# Patient Record
Sex: MALE | Race: WHITE
[De-identification: names, ages, dates, MRNs, and addresses within clinical notes are randomized per-mention and may not be internally consistent; named-entity substitution may affect disease eponyms.]

---

## 2018-08-21 ENCOUNTER — HOSPITAL ENCOUNTER (EMERGENCY)
Dept: HOSPITAL 97 - ER | Age: 33
Discharge: HOME | End: 2018-08-21
Payer: COMMERCIAL

## 2018-08-21 DIAGNOSIS — I10: Primary | ICD-10-CM

## 2018-08-21 DIAGNOSIS — F90.9: ICD-10-CM

## 2018-08-21 LAB
ALBUMIN SERPL BCP-MCNC: 3.7 G/DL (ref 3.4–5)
ALP SERPL-CCNC: 49 U/L (ref 45–117)
ALT SERPL W P-5'-P-CCNC: 24 U/L (ref 12–78)
AST SERPL W P-5'-P-CCNC: 17 U/L (ref 15–37)
BUN BLD-MCNC: 19 MG/DL (ref 7–18)
GLUCOSE SERPLBLD-MCNC: 91 MG/DL (ref 74–106)
HCT VFR BLD CALC: 46.6 % (ref 39.6–49)
INR BLD: 1.09
LYMPHOCYTES # SPEC AUTO: 1.6 K/UL (ref 0.7–4.9)
MAGNESIUM SERPL-MCNC: 2 MG/DL (ref 1.8–2.4)
MCH RBC QN AUTO: 28.4 PG (ref 27–35)
MCV RBC: 84.1 FL (ref 80–100)
PMV BLD: 9.9 FL (ref 7.6–11.3)
POTASSIUM SERPL-SCNC: 3.7 MMOL/L (ref 3.5–5.1)
RBC # BLD: 5.53 M/UL (ref 4.33–5.43)

## 2018-08-21 PROCEDURE — 85025 COMPLETE CBC W/AUTO DIFF WBC: CPT

## 2018-08-21 PROCEDURE — 84484 ASSAY OF TROPONIN QUANT: CPT

## 2018-08-21 PROCEDURE — 82550 ASSAY OF CK (CPK): CPT

## 2018-08-21 PROCEDURE — 85610 PROTHROMBIN TIME: CPT

## 2018-08-21 PROCEDURE — 93005 ELECTROCARDIOGRAM TRACING: CPT

## 2018-08-21 PROCEDURE — 83735 ASSAY OF MAGNESIUM: CPT

## 2018-08-21 PROCEDURE — 36415 COLL VENOUS BLD VENIPUNCTURE: CPT

## 2018-08-21 PROCEDURE — 71045 X-RAY EXAM CHEST 1 VIEW: CPT

## 2018-08-21 PROCEDURE — 80048 BASIC METABOLIC PNL TOTAL CA: CPT

## 2018-08-21 PROCEDURE — 85730 THROMBOPLASTIN TIME PARTIAL: CPT

## 2018-08-21 PROCEDURE — 99284 EMERGENCY DEPT VISIT MOD MDM: CPT

## 2018-08-21 PROCEDURE — 80076 HEPATIC FUNCTION PANEL: CPT

## 2018-08-21 NOTE — ER
Nurse's Notes                                                                                     

 [facility]                                                                

Name: Kira Muhamamd III                                                                           

Age: 33 yrs                                                                                       

Sex: Male                                                                                         

: 1985                                                                                   

MRN: [id number]                                                                                   

Arrival Date: 2018                                                                          

Time: 17:28                                                                                       

Account#: [id number]                                                                            

Bed 16                                                                                            

Private MD:                                                                                       

Diagnosis: Essential (primary) hypertension                                                       

                                                                                                  

Presentation:                                                                                     

                                                                                             

17:25 Presenting complaint: EMS states: non radiating central chest pain and dizziness since  cc3 

      a couple of days, currently patient has no chest pain. Transition of care: patient was      

      not received from another setting of care. Onset of symptoms is unknown. Risk               

      Assessment: Do you want to hurt yourself or someone else? Patient reports no desire to      

      harm self or others. Initial Sepsis Screen: Does the patient meet any 2 criteria? No.       

      Patient's initial sepsis screen is negative. Does the patient have a suspected source       

      of infection? No. Patient's initial sepsis screen is negative. Care prior to arrival:       

      EMS started an IV fluid of NS at [facility].                                                       

17:25 Method Of Arrival: EMS: [facility]                                                cc3 

17:25 Acuity: SOFIE 3                                                                           cc3 

                                                                                                  

Triage Assessment:                                                                                

17:25 General: Appears in no apparent distress. comfortable, Behavior is calm, cooperative,   cc3 

      appropriate for age. Pain: Denies pain. EENT: No signs and/or symptoms were reported        

      regarding the EENT system. Neuro: Level of Consciousness is awake, alert, obeys             

      commands, Oriented to person, place, time, situation, Appropriate for age.                  

      Cardiovascular: Denies chest pain, patient had chest pain and dizziness since a couple      

      of days before but currently denies chest pain. Respiratory: Airway is patent               

      Respiratory effort is even, unlabored, Respiratory pattern is regular, symmetrical. GI:     

      No signs and/or symptoms were reported involving the gastrointestinal system. : No        

      signs and/or symptoms were reported regarding the genitourinary system. Derm: No signs      

      and/or symptoms reported regarding the dermatologic system. Musculoskeletal: No signs       

      and/or symptoms reported regarding the musculoskeletal system.                              

                                                                                                  

Historical:                                                                                       

- Allergies:                                                                                      

17:25 No Known Allergies;                                                                     cc3 

- Home Meds:                                                                                      

17:25 Focalin oral 40 mg tab oral 1 tab once a day [Active];                                  cc3 

- PMHx:                                                                                           

17:25 ADD/ADHD;                                                                               cc3 

- PSHx:                                                                                           

17:25 gastric sleeve surgery in ;                                                         cc3 

                                                                                                  

- Immunization history:: Adult Immunizations up to date.                                          

- Social history:: Smoking status: Patient/guardian denies using tobacco, never smoked.           

- Ebola Screening: : Patient denies travel to an Ebola-affected area in the 21 days               

  before illness onset No symptoms or risks identified at this time.                              

                                                                                                  

                                                                                                  

Screenin:25 Abuse screen: Denies threats or abuse. Denies injuries from another. Nutritional        hj  

      screening: No deficits noted. Tuberculosis screening: No symptoms or risk factors           

      identified. Fall Risk None identified.                                                      

17:25 Abuse screen: Denies threats or abuse. Denies injuries from another. Nutritional        cc3 

      screening: No deficits noted. Tuberculosis screening: No symptoms or risk factors           

      identified. Fall Risk IV access (20 points).                                                

                                                                                                  

Assessment:                                                                                       

17:25 General: see triage assessment.                                                         cc3 

18:32 Reassessment: Patient and/or family updated on plan of care and expected duration. Pain cc3 

      level reassessed. Patient is alert, oriented x 3, equal unlabored respirations, skin        

      warm/dry/pink. Patient states feeling better. Patient states symptoms have improved.        

19:56 Reassessment: Patient appears in no apparent distress at this time. Patient and/or      bs1 

      family updated on plan of care and expected duration. Pain level reassessed. Patient is     

      alert, oriented x 3, equal unlabored respirations, skin warm/dry/pink. Patient states       

      understanding of discharge instructions/POC. Patient denies pain at this time. Patient      

      states feeling better.                                                                      

                                                                                                  

Vital Signs:                                                                                      

17:25  / 94; Pulse 77; Resp 19; Pulse Ox 100% on R/A; Pain 0/10;                        cc3 

18:32  / 96; Pulse 65; Resp 18; Pulse Ox 100% on R/A;                                   cc3 

18:45  / 81 Supine; Pulse 68;                                                           cc3 

18:47  / 94 Sitting; Pulse 67;                                                          cc3 

18:49  / 96 Standing; Pulse 67;                                                         cc3 

19:30  / 88; Pulse 57; Resp 17; Temp 98(O); Pulse Ox 99% on R/A; Pain 0/10;             bs1 

                                                                                                  

[place] Coma Score:                                                                               

17:25 Eye Response: spontaneous(4). Verbal Response: oriented(5). Motor Response: obeys       cc3 

      commands(6). Total: 15.                                                                     

                                                                                                  

ED Course:                                                                                        

17:25 Maintain EMS IV. Dressing intact. Good blood return noted. Site clean \T\ dry. Gauge \T\    cc
3

      site: 18 right ACV.                                                                         

17:25 Arm band placed on right wrist.                                                         cc3 

17:25 Patient has correct armband on for positive identification. Placed in gown. Bed in low  hj  

      position. Call light in reach. Side rails up X 1. Adult w/ patient.                         

17:28 Patient arrived in ED.                                                                  hj  

17:28 Gilberto Marinelli NP is PHCP.                                                           pm1 

17:28 Cristobal Carlton MD is Attending Physician.                                                pm1 

17:43 EKG done, by EKG tech. reviewed by Gilberto Marinelli NP.                                  sm3 

17:45 Jose A Dominguez, RN is Primary Nurse.                                                    hj  

18:04 Triage completed.                                                                       cc3 

18:17 X-ray completed. Portable x-ray completed in exam room. Patient tolerated procedure     mh1 

      well.                                                                                       

18:18 XRAY Chest (1 view) In Process Unspecified.                                             EDMS

19:00 Report given to night shift AXEL Salcedo for continuity of care.                             cc3 

19:55 No provider procedures requiring assistance completed. IV discontinued, bleeding        bs1 

      controlled, No redness/swelling at site. Pressure dressing applied.                         

                                                                                                  

Administered Medications:                                                                         

No medications were administered                                                                  

                                                                                                  

                                                                                                  

Outcome:                                                                                          

19:25 Discharge ordered by MD.                                                                pm1 

19:56 Discharged to home ambulatory, with significant other.                                  bs1 

19:56 Condition: stable                                                                           

19:56 Discharge instructions given to patient, Instructed on discharge instructions, follow       

      up and referral plans. Demonstrated understanding of instructions, follow-up care.          

19:56 Patient left the ED.                                                                    bs1 

                                                                                                  

Signatures:                                                                                       

Dispatcher [employer]                           EDMS                                                 

Keyla Trujillo                               [facility]                                                  

Jose A Dominguez RN RN                                                      

Gilberto Marinelli NP                    NP   pm1                                                  

Joselyn Fink RN                   RN   bs1                                                  

Lissette Reddy                              sm3                                                  

Nelia Kim                             cc3                                                  

                                                                                                  

Corrections: (The following items were deleted from the chart)                                    

19:21 18:45  / 81 Supine; cc3                                                           cc3 

19:21 18:47  / 94 Sitting; cc3                                                          cc3 

19:21 18:49  / 96 Standing; cc3                                                         cc3 

                                                                                                  

**************************************************************************************************

## 2018-08-21 NOTE — RAD REPORT
EXAM DESCRIPTION:  RAD - Chest Single View - 8/21/2018 6:18 pm

 

CLINICAL HISTORY:  Chest pain

 

COMPARISON:  None.

 

TECHNIQUE:  AP portable chest image was obtained 1814 hours .

 

FINDINGS:  Lungs are clear. Heart and vasculature are normal. No measurable pleural effusion and no p
neumothorax. No gross bony abnormality seen. No acute aortic findings suspected.

 

IMPRESSION:  No acute cardiopulmonary process.

## 2018-08-21 NOTE — EDPHYS
Physician Documentation                                                                           

 [facility]                                                                

Name: Kira Muhammad III                                                                           

Age: 33 yrs                                                                                       

Sex: Male                                                                                         

: 1985                                                                                   

MRN: [id number]                                                                                   

Arrival Date: 2018                                                                          

Time: 17:28                                                                                       

Account#: [id number]                                                                            

Bed 16                                                                                            

Private MD:                                                                                       

ED Physician Cristobal Carlton                                                                         

HPI:                                                                                              

                                                                                             

19:00 This 33 yrs old  Male presents to ER via EMS with complaints of High Blood     pm1 

      Pressure.                                                                                   

19:00 The patient has elevated blood pressure and discovered this at home. Onset: The         pm1 

      symptoms/episode began/occurred yesterday. Associated signs and symptoms: Pertinent         

      positives: Chest pain and palpitations yesterday that resolved. Severity of symptoms:       

      in the emergency department the blood pressure is. The patient has not recently seen a      

      physician. Patient with chest pain and sensation of palpitations yesterday that             

      resolved. Patient with elevated blood pressure today and went to the fire station for       

      further evaluation. He got an EKG and was told that it might be abnormal and to get it      

      further evaluated. Patient without any complaints today other than elevated blood           

      pressure. Patient without any chest pain or shortness of breath that resolved yesterday.    

                                                                                                  

Historical:                                                                                       

- Allergies:                                                                                      

17:25 No Known Allergies;                                                                     cc3 

- Home Meds:                                                                                      

17:25 Focalin oral 40 mg tab oral 1 tab once a day [Active];                                  cc3 

- PMHx:                                                                                           

17:25 ADD/ADHD;                                                                               cc3 

- PSHx:                                                                                           

17:25 gastric sleeve surgery in ;                                                         cc3 

                                                                                                  

- Immunization history:: Adult Immunizations up to date.                                          

- Social history:: Smoking status: Patient/guardian denies using tobacco, never smoked.           

- Ebola Screening: : Patient denies travel to an Ebola-affected area in the 21 days               

  before illness onset No symptoms or risks identified at this time.                              

                                                                                                  

                                                                                                  

ROS:                                                                                              

19:00 Constitutional: Negative for fever, chills, and weight loss, Eyes: Negative for injury, pm1 

      pain, redness, and discharge, ENT: Negative for injury, pain, and discharge, Neck:          

      Negative for injury, pain, and swelling, Cardiovascular: Negative for chest pain,           

      palpitations, and edema, Respiratory: Negative for shortness of breath, cough,              

      wheezing, and pleuritic chest pain, Abdomen/GI: Negative for abdominal pain, nausea,        

      vomiting, diarrhea, and constipation, Back: Negative for injury and pain, : Negative      

      for injury, bleeding, discharge, and swelling, MS/Extremity: Negative for injury and        

      deformity, Skin: Negative for injury, rash, and discoloration, Neuro: Negative for          

      headache, weakness, numbness, tingling, and seizure.                                        

                                                                                                  

Exam:                                                                                             

19:00 Constitutional:  This is a well developed, well nourished patient who is awake, alert,  pm1 

      and in no acute distress. Head/Face:  Normocephalic, atraumatic. Eyes:  Pupils equal        

      round and reactive to light, extra-ocular motions intact.  Lids and lashes normal.          

      Conjunctiva and sclera are non-icteric and not injected.  Cornea within normal limits.      

      Periorbital areas with no swelling, redness, or edema. ENT:  Nares patent. No nasal         

      discharge, no septal abnormalities noted.  Tympanic membranes are normal and external       

      auditory canals are clear.  Oropharynx with no redness, swelling, or masses, exudates,      

      or evidence of obstruction, uvula midline.  Mucous membranes moist. Neck:  Trachea          

      midline, no thyromegaly or masses palpated, and no cervical lymphadenopathy.  Supple,       

      full range of motion without nuchal rigidity, or vertebral point tenderness.  No            

      Meningismus. Chest/axilla:  Normal chest wall appearance and motion.  Nontender with no     

      deformity.  No lesions are appreciated. Cardiovascular:  Regular rate and rhythm with a     

      normal S1 and S2.  No gallops, murmurs, or rubs.  Normal PMI, no JVD.  No pulse             

      deficits. Respiratory:  Lungs have equal breath sounds bilaterally, clear to                

      auscultation and percussion.  No rales, rhonchi or wheezes noted.  No increased work of     

      breathing, no retractions or nasal flaring. Abdomen/GI:  Soft, non-tender, with normal      

      bowel sounds.  No distension or tympany.  No guarding or rebound.  No evidence of           

      tenderness throughout. Back:  No spinal tenderness.  No costovertebral tenderness.          

      Full range of motion. Skin:  Warm, dry with normal turgor.  Normal color with no            

      rashes, no lesions, and no evidence of cellulitis. MS/ Extremity:  Pulses equal, no         

      cyanosis.  Neurovascular intact.  Full, normal range of motion.                             

19:00 Neuro: Orientation: is normal, Motor: is normal, moves all fours.                           

                                                                                                  

Vital Signs:                                                                                      

17:25  / 94; Pulse 77; Resp 19; Pulse Ox 100% on R/A; Pain 0/10;                        cc3 

18:32  / 96; Pulse 65; Resp 18; Pulse Ox 100% on R/A;                                   cc3 

18:45  / 81 Supine; Pulse 68;                                                           cc3 

18:47  / 94 Sitting; Pulse 67;                                                          cc3 

18:49  / 96 Standing; Pulse 67;                                                         cc3 

19:30  / 88; Pulse 57; Resp 17; Temp 98(O); Pulse Ox 99% on R/A; Pain 0/10;             bs1 

                                                                                                  

Madhu Coma Score:                                                                               

17:25 Eye Response: spontaneous(4). Verbal Response: oriented(5). Motor Response: obeys       cc3 

      commands(6). Total: 15.                                                                     

                                                                                                  

MDM:                                                                                              

17:28 Patient medically screened.                                                             pm1 

19:24 Data reviewed: vital signs. Data interpreted: Pulse oximetry: on room air is 100 %.     pm1 

      Interpretation: normal. Counseling: I had a detailed discussion with the patient and/or     

      guardian regarding: the historical points, exam findings, and any diagnostic results        

      supporting the discharge/admit diagnosis, lab results, radiology results, the need for      

      outpatient follow up, to return to the emergency department if symptoms worsen or           

      persist or if there are any questions or concerns that arise at home.                       

                                                                                                  

                                                                                             

17:38 Order name: Ptt, Activated; Complete Time: 19:01                                        pm1 

                                                                                             

17:38 Order name: PT-INR; Complete Time: 19:01                                                pm1 

                                                                                             

17:38 Order name: Basic Metabolic Panel; Complete Time: 19:01                                 pm1 

                                                                                             

17:38 Order name: CBC with Diff; Complete Time: 19:01                                         pm                                                                                             

17:38 Order name: CPK; Complete Time: 19:01                                                   pm1 

                                                                                             

17:38 Order name: LFT's; Complete Time: 19:01                                                 pm1 

                                                                                             

17:38 Order name: Magnesium; Complete Time: 19:01                                             pm1 

                                                                                             

17:38 Order name: Troponin (emerg Dept Use Only); Complete Time: 19:01                        pm1 

                                                                                             

17:38 Order name: XRAY Chest (1 view)                                                         pm1 

                                                                                             

17:38 Order name: Cardiac monitoring; Complete Time: 17:45                                    pm1 

                                                                                             

17:38 Order name: EKG - Nurse/Tech; Complete Time: 17:45                                      pm1 

                                                                                             

17:38 Order name: IV Saline Lock; Complete Time: 17:54                                        pm1 

                                                                                             

18:01 Order name: EKG Electrocardiogram; Complete Time: 18:44                                 EDMS

                                                                                             

17:38 Order name: Labs collected and sent; Complete Time: 17:54                               pm1 

                                                                                             

17:38 Order name: O2 Per Protocol; Complete Time: 17:46                                       pm1 

                                                                                             

17:38 Order name: O2 Sat Monitoring; Complete Time: 17:46                                     pm1 

                                                                                             

17:38 Order name: Orthostatic Blood Pressure; Complete Time: 18:52                            pm1 

                                                                                                  

Administered Medications:                                                                         

No medications were administered                                                                  

                                                                                                  

                                                                                                  

Disposition:                                                                                      

                                                                                             

06:03 Co-signature as Attending Physician, Cristobal Carlton MD.                                    rn  

                                                                                                  

Disposition:                                                                                      

18 19:25 Discharged to Home. Impression: Essential (primary) hypertension.                  

- Condition is Stable.                                                                            

- Discharge Instructions: Hypertension, How to Take Your Blood Pressure, Easy-to-Read,            

  DASH Eating Plan, Managing Your Hypertension.                                                   

                                                                                                  

- Medication Reconciliation Form, Thank You Letter, SBAR form form.                               

- Follow up: Emergency Department; When: As needed; Reason: Worsening of condition.               

  Follow up: Private Physician; When: 2 - 3 days; Reason: Recheck today's complaints,             

  Continuance of care, Re-evaluation by your physician.                                           

- Problem is new.                                                                                 

- Symptoms have improved.                                                                         

                                                                                                  

                                                                                                  

                                                                                                  

Signatures:                                                                                       

Dispatcher [employer]                           EDMS                                                 

Cristobal Carlton MD MD rn Marinas, Patrick, NP                    NP   pm1                                                  

Joselyn Fink, RN                   RN   bs1                                                  

Nelia Kim                             cc3                                                  

                                                                                                  

Corrections: (The following items were deleted from the chart)                                    

                                                                                             

19:25 19:25 2018 19:25 Discharged to Home. Impression: Essential (primary)              pm1 

      hypertension; Palpitations. Condition is Stable. Forms are SBAR form, Medication            

      Reconciliation Form, Thank You Letter, Antibiotic Education, Prescription Opioid Use.       

      Follow up: Emergency Department; When: As needed; Reason: Worsening of condition.           

      Follow up: Private Physician; When: 2 - 3 days; Reason: Recheck today's complaints,         

      Continuance of care, Re-evaluation by your physician. Problem is new. Symptoms have         

      improved. pm1                                                                               

19:56 19:25 2018 19:25 Discharged to Home. Impression: Essential (primary)              bs1 

      hypertension. Condition is Stable. Forms are SBAR form, Medication Reconciliation Form,     

      Thank You Letter, Antibiotic Education, Prescription Opioid Use. Follow up: Emergency       

      Department; When: As needed; Reason: Worsening of condition. Follow up: Private             

      Physician; When: 2 - 3 days; Reason: Recheck today's complaints, Continuance of care,       

      Re-evaluation by your physician. Problem is new. Symptoms have improved. pm1                

                                                                                                  

**************************************************************************************************

## 2018-08-22 NOTE — EKG
Test Date:    2018-08-21               Test Time:    17:30:33

Technician:   DANIELLE                                     

                                                     

MEASUREMENT RESULTS:                                       

Intervals:                                           

Rate:         65                                     

ND:           140                                    

QRSD:         86                                     

QT:           374                                    

QTc:          388                                    

Axis:                                                

P:            55                                     

ND:           140                                    

QRS:          55                                     

T:            43                                     

                                                     

INTERPRETIVE STATEMENTS:                                       

                                                     

Normal sinus rhythm

Normal ECG

No previous ECG available for comparison



Electronically Signed On 08-22-18 07:19:52 CDT by Sudhir Barker